# Patient Record
Sex: FEMALE | Race: WHITE | Employment: STUDENT | URBAN - NONMETROPOLITAN AREA
[De-identification: names, ages, dates, MRNs, and addresses within clinical notes are randomized per-mention and may not be internally consistent; named-entity substitution may affect disease eponyms.]

---
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
## 2020-02-13 LAB — EXTERNAL GROUP B STREP ANTIGEN: NEGATIVE

## 2020-02-16 ENCOUNTER — HOSPITAL ENCOUNTER (OUTPATIENT)
Facility: HOSPITAL | Age: 20
Discharge: HOME | End: 2020-02-16
Attending: OBSTETRICS & GYNECOLOGY | Admitting: OBSTETRICS & GYNECOLOGY

## 2020-02-16 VITALS
HEIGHT: 63 IN | SYSTOLIC BLOOD PRESSURE: 130 MMHG | HEART RATE: 88 BPM | DIASTOLIC BLOOD PRESSURE: 77 MMHG | RESPIRATION RATE: 18 BRPM | TEMPERATURE: 98 F | BODY MASS INDEX: 28.74 KG/M2 | WEIGHT: 162.2 LBS

## 2020-02-16 PROCEDURE — G0463 HOSPITAL OUTPT CLINIC VISIT: HCPCS

## 2020-02-17 NOTE — NURSING NOTE
Patient presented to LDR with c/o contractions. No leaking of blood or fluid reported. Reports feeling good fetal movement    Kira Leyva RN

## 2020-03-15 ENCOUNTER — ANESTHESIA (OUTPATIENT)
Dept: LABOR AND DELIVERY | Facility: HOSPITAL | Age: 20
End: 2020-03-15

## 2020-03-15 ENCOUNTER — HOSPITAL ENCOUNTER (INPATIENT)
Facility: HOSPITAL | Age: 20
LOS: 3 days | Discharge: HOME | End: 2020-03-18
Attending: OBSTETRICS & GYNECOLOGY | Admitting: OBSTETRICS & GYNECOLOGY

## 2020-03-15 ENCOUNTER — ANESTHESIA EVENT (OUTPATIENT)
Dept: LABOR AND DELIVERY | Facility: HOSPITAL | Age: 20
End: 2020-03-15

## 2020-03-15 DIAGNOSIS — Z3A.39 39 WEEKS GESTATION OF PREGNANCY: Primary | ICD-10-CM

## 2020-03-15 PROBLEM — Z37.9 NORMAL LABOR: Status: ACTIVE | Noted: 2020-03-15

## 2020-03-15 LAB
ABO GROUP BLD: NORMAL
BLD GP AB SCN SERPL QL: NEGATIVE
DEPRECATED RDW RBC AUTO: 39.5 FL (ref 37–54)
ERYTHROCYTE [DISTWIDTH] IN BLOOD BY AUTOMATED COUNT: 13.8 % (ref 12.3–15.4)
HCT VFR BLD AUTO: 34.4 % (ref 34–46.6)
HGB BLD-MCNC: 11.3 G/DL (ref 12–15.9)
MCH RBC QN AUTO: 26.4 PG (ref 26.6–33)
MCHC RBC AUTO-ENTMCNC: 32.8 G/DL (ref 31.5–35.7)
MCV RBC AUTO: 80.4 FL (ref 79–97)
PLATELET # BLD AUTO: 212 10*3/MM3 (ref 140–450)
PMV BLD AUTO: 12 FL (ref 6–12)
RBC # BLD AUTO: 4.28 10*6/MM3 (ref 3.77–5.28)
RH BLD: NEGATIVE
T&S EXPIRATION DATE: NORMAL
WBC NRBC COR # BLD: 13 10*3/MM3 (ref 3.4–10.8)

## 2020-03-15 PROCEDURE — 86850 RBC ANTIBODY SCREEN: CPT | Performed by: OBSTETRICS & GYNECOLOGY

## 2020-03-15 PROCEDURE — 25010000002 BUTORPHANOL PER 1 MG: Performed by: OBSTETRICS & GYNECOLOGY

## 2020-03-15 PROCEDURE — 85027 COMPLETE CBC AUTOMATED: CPT | Performed by: OBSTETRICS & GYNECOLOGY

## 2020-03-15 PROCEDURE — C1755 CATHETER, INTRASPINAL: HCPCS | Performed by: NURSE ANESTHETIST, CERTIFIED REGISTERED

## 2020-03-15 PROCEDURE — 25010000002 FENTANYL CITRATE (PF) 250 MCG/5ML SOLUTION: Performed by: NURSE ANESTHETIST, CERTIFIED REGISTERED

## 2020-03-15 PROCEDURE — 86901 BLOOD TYPING SEROLOGIC RH(D): CPT | Performed by: OBSTETRICS & GYNECOLOGY

## 2020-03-15 PROCEDURE — 86900 BLOOD TYPING SEROLOGIC ABO: CPT | Performed by: OBSTETRICS & GYNECOLOGY

## 2020-03-15 PROCEDURE — 25010000002 ROPIVACAINE PER 1 MG: Performed by: NURSE ANESTHETIST, CERTIFIED REGISTERED

## 2020-03-15 RX ORDER — FENTANYL CITRATE 50 UG/ML
INJECTION, SOLUTION INTRAMUSCULAR; INTRAVENOUS AS NEEDED
Status: DISCONTINUED | OUTPATIENT
Start: 2020-03-15 | End: 2020-03-16 | Stop reason: SURG

## 2020-03-15 RX ORDER — OXYTOCIN/0.9 % SODIUM CHLORIDE 30/500 ML
2-20 PLASTIC BAG, INJECTION (ML) INTRAVENOUS
Status: DISCONTINUED | OUTPATIENT
Start: 2020-03-16 | End: 2020-03-16

## 2020-03-15 RX ORDER — ROPIVACAINE HYDROCHLORIDE 2 MG/ML
INJECTION, SOLUTION EPIDURAL; INFILTRATION; PERINEURAL AS NEEDED
Status: DISCONTINUED | OUTPATIENT
Start: 2020-03-15 | End: 2020-03-16 | Stop reason: SURG

## 2020-03-15 RX ORDER — OXYTOCIN/0.9 % SODIUM CHLORIDE 30/500 ML
125 PLASTIC BAG, INJECTION (ML) INTRAVENOUS CONTINUOUS PRN
Status: COMPLETED | OUTPATIENT
Start: 2020-03-15 | End: 2020-03-16

## 2020-03-15 RX ORDER — TERBUTALINE SULFATE 1 MG/ML
0.25 INJECTION, SOLUTION SUBCUTANEOUS AS NEEDED
Status: DISCONTINUED | OUTPATIENT
Start: 2020-03-15 | End: 2020-03-16 | Stop reason: HOSPADM

## 2020-03-15 RX ORDER — SODIUM CHLORIDE 0.9 % (FLUSH) 0.9 %
1-10 SYRINGE (ML) INJECTION AS NEEDED
Status: DISCONTINUED | OUTPATIENT
Start: 2020-03-15 | End: 2020-03-16 | Stop reason: HOSPADM

## 2020-03-15 RX ORDER — OXYTOCIN/0.9 % SODIUM CHLORIDE 30/500 ML
250 PLASTIC BAG, INJECTION (ML) INTRAVENOUS CONTINUOUS
Status: DISPENSED | OUTPATIENT
Start: 2020-03-15 | End: 2020-03-15

## 2020-03-15 RX ORDER — OXYTOCIN/0.9 % SODIUM CHLORIDE 30/500 ML
999 PLASTIC BAG, INJECTION (ML) INTRAVENOUS ONCE
Status: COMPLETED | OUTPATIENT
Start: 2020-03-15 | End: 2020-03-16

## 2020-03-15 RX ORDER — BUTORPHANOL TARTRATE 1 MG/ML
1 INJECTION, SOLUTION INTRAMUSCULAR; INTRAVENOUS
Status: DISCONTINUED | OUTPATIENT
Start: 2020-03-15 | End: 2020-03-15

## 2020-03-15 RX ORDER — SODIUM CHLORIDE 0.9 % (FLUSH) 0.9 %
3 SYRINGE (ML) INJECTION EVERY 12 HOURS SCHEDULED
Status: DISCONTINUED | OUTPATIENT
Start: 2020-03-15 | End: 2020-03-16 | Stop reason: HOSPADM

## 2020-03-15 RX ORDER — LIDOCAINE HYDROCHLORIDE AND EPINEPHRINE 15; 5 MG/ML; UG/ML
INJECTION, SOLUTION EPIDURAL AS NEEDED
Status: DISCONTINUED | OUTPATIENT
Start: 2020-03-15 | End: 2020-03-16 | Stop reason: SURG

## 2020-03-15 RX ORDER — ACETAMINOPHEN 325 MG/1
650 TABLET ORAL EVERY 4 HOURS PRN
Status: DISCONTINUED | OUTPATIENT
Start: 2020-03-15 | End: 2020-03-16 | Stop reason: HOSPADM

## 2020-03-15 RX ORDER — BUTORPHANOL TARTRATE 1 MG/ML
2 INJECTION, SOLUTION INTRAMUSCULAR; INTRAVENOUS
Status: DISCONTINUED | OUTPATIENT
Start: 2020-03-15 | End: 2020-03-16 | Stop reason: HOSPADM

## 2020-03-15 RX ORDER — SODIUM CHLORIDE, SODIUM LACTATE, POTASSIUM CHLORIDE, CALCIUM CHLORIDE 600; 310; 30; 20 MG/100ML; MG/100ML; MG/100ML; MG/100ML
125 INJECTION, SOLUTION INTRAVENOUS CONTINUOUS
Status: DISCONTINUED | OUTPATIENT
Start: 2020-03-15 | End: 2020-03-16

## 2020-03-15 RX ORDER — BUTORPHANOL TARTRATE 1 MG/ML
1 INJECTION, SOLUTION INTRAMUSCULAR; INTRAVENOUS
Status: DISCONTINUED | OUTPATIENT
Start: 2020-03-15 | End: 2020-03-16 | Stop reason: SDUPTHER

## 2020-03-15 RX ORDER — EPHEDRINE SULFATE 50 MG/ML
10 INJECTION, SOLUTION INTRAVENOUS
Status: DISCONTINUED | OUTPATIENT
Start: 2020-03-15 | End: 2020-03-16 | Stop reason: HOSPADM

## 2020-03-15 RX ORDER — LIDOCAINE HYDROCHLORIDE 10 MG/ML
5 INJECTION, SOLUTION EPIDURAL; INFILTRATION; INTRACAUDAL; PERINEURAL AS NEEDED
Status: DISCONTINUED | OUTPATIENT
Start: 2020-03-15 | End: 2020-03-16 | Stop reason: HOSPADM

## 2020-03-15 RX ADMIN — SODIUM CHLORIDE, POTASSIUM CHLORIDE, SODIUM LACTATE AND CALCIUM CHLORIDE 125 ML/HR: 600; 310; 30; 20 INJECTION, SOLUTION INTRAVENOUS at 21:00

## 2020-03-15 RX ADMIN — ROPIVACAINE HYDROCHLORIDE 10 ML: 2 INJECTION, SOLUTION EPIDURAL; INFILTRATION at 19:50

## 2020-03-15 RX ADMIN — BUTORPHANOL TARTRATE 1 MG: 1 INJECTION, SOLUTION INTRAMUSCULAR; INTRAVENOUS at 18:54

## 2020-03-15 RX ADMIN — ROPIVACAINE HYDROCHLORIDE 8 ML/HR: 2 INJECTION, SOLUTION EPIDURAL; INFILTRATION at 19:59

## 2020-03-15 RX ADMIN — SODIUM CHLORIDE, POTASSIUM CHLORIDE, SODIUM LACTATE AND CALCIUM CHLORIDE 125 ML/HR: 600; 310; 30; 20 INJECTION, SOLUTION INTRAVENOUS at 19:16

## 2020-03-15 RX ADMIN — SODIUM CHLORIDE, POTASSIUM CHLORIDE, SODIUM LACTATE AND CALCIUM CHLORIDE 999 ML/HR: 600; 310; 30; 20 INJECTION, SOLUTION INTRAVENOUS at 19:30

## 2020-03-15 RX ADMIN — LIDOCAINE HYDROCHLORIDE AND EPINEPHRINE 3 ML: 15; 5 INJECTION, SOLUTION EPIDURAL at 19:41

## 2020-03-15 RX ADMIN — FENTANYL CITRATE 150 MCG: 50 INJECTION, SOLUTION INTRAMUSCULAR; INTRAVENOUS at 19:59

## 2020-03-15 RX ADMIN — SODIUM CHLORIDE, POTASSIUM CHLORIDE, SODIUM LACTATE AND CALCIUM CHLORIDE 125 ML/HR: 600; 310; 30; 20 INJECTION, SOLUTION INTRAVENOUS at 17:04

## 2020-03-15 RX ADMIN — FENTANYL CITRATE 100 MCG: 50 INJECTION, SOLUTION INTRAMUSCULAR; INTRAVENOUS at 19:50

## 2020-03-15 RX ADMIN — OXYTOCIN-SODIUM CHLORIDE 0.9% IV SOLN 30 UNIT/500ML 2 MILLI-UNITS/MIN: 30-0.9/5 SOLUTION at 23:18

## 2020-03-15 RX ADMIN — BUTORPHANOL TARTRATE 1 MG: 1 INJECTION, SOLUTION INTRAMUSCULAR; INTRAVENOUS at 17:36

## 2020-03-15 NOTE — PROGRESS NOTES
Prasanna Alexander MD  [facility]  [address]  [facility], [place]  Office [phone]  Fax [phone]      [facility]  Melissa Ruano  : 2000  MRN: [id number]  CSN: [id number]    Labor progress note    Subjective   She reports is feeling painful contraction     Objective   Min/max vitals past 24 hours:  Temp  Min: 97.8 °F (36.6 °C)  Max: 97.8 °F (36.6 °C)   BP  Min: 131/86  Max: 131/86   Pulse  Min: 76  Max: 76   Resp  Min: 18  Max: 18        FHT's: reactive and category 1.  external monitors used   Cervix: was checked (by me): 3 cm / 90 % / -2   Contractions: irregular      Assessment   1. IUP at 39w4d  2. Spontaneous onset of labor  3. GBS negative  4. Fetal status reassuring     Plan   1.   Expectant management  AROM - Clear fluid seen  OK for epidural  Allow labor to continue pending maternal and fetal status  Plan discussed with family and questions answered.  Understanding verbalized.    Prasanna Alexander MD  3/15/2020  18:47

## 2020-03-15 NOTE — H&P
"    Prasanna Aelxander MD  [facility]  [address]  [place] [address]  Office [phone]  Fax [phone]      [facility]  Melissa Ruano  : 2000  MRN: [id number]  CSN: [id number]    History and Physical    Subjective   Melissa Ruano is a 19 y.o. year old  with an Estimated Date of Delivery: 3/18/20 currently at 39w4d presenting with regular contractions.  Patient has made cervical change based on my exam.  Over a 2-hour period, she is changed from 1 cm to 3 cm.  Her effacement has also increased.    Prenatal care has been with Dr. Rangel.  It has been benign.    OB History    Para Term  AB Living   1 0 0 0 0 0   SAB TAB Ectopic Molar Multiple Live Births   0 0 0 0 0 0      # Outcome Date GA Lbr Sarwat/2nd Weight Sex Delivery Anes PTL Lv   1 Current                Past Medical History:   Diagnosis Date   • Ovarian cyst        Past Surgical History:   Procedure Laterality Date   • DENTAL PROCEDURE      DENTAL SURGERY- METAL CAPS       No current facility-administered medications for this encounter.     Allergies   Allergen Reactions   • Penicillins Hives     Pt stated since she was a baby   • Latex Swelling       History reviewed. No pertinent family history.    Social History     Tobacco Use   • Smoking status: Former Smoker   • Smokeless tobacco: Never Used   Substance Use Topics   • Alcohol use: Never     Frequency: Never   • Drug use: Never       Review of Systems        Objective   /86 (BP Location: Right arm, Patient Position: Lying)   Pulse 76   Temp 97.8 °F (36.6 °C) (Temporal)   Resp 18   Ht 160 cm (63\")   Wt 75 kg (165 lb 6 oz)   BMI 29.29 kg/m²   General: well developed; well nourished  no acute distress   Heart: Not performed.   Lungs: breathing is unlabored   Abdomen: soft, non-tender; no masses  no umbilical or inguinal hernias are present  no hepato-splenomegaly   FHT's: reactive and category 1  external monitors used   Cervix: was " checked (by me): 3 cm / 90 % / -3   Presentation: cephalic   Contractions: regular    EFW 7 to 8 pounds by Leopold's  Pelvis subjectively adequate     Prenatal Labs  Lab Results   Component Value Date    HEPBSAG Negative 08/08/2019    ABO O 08/20/2019    RH Negative 08/20/2019    ABSCRN Negative 08/20/2019    [id number] Non-Reactive 08/08/2019    URINECX 25,000 CFU/mL Lactobacillus species (A) 12/22/2019       Current Labs Reviewed   Prenatal labs       Assessment   1. IUP at 39w4d  2. Group B strep status: negative  3. Fetal status reassuring  4. Spontaneous onset of labor, early     Plan   1. Expectant management   2. Admission  3. AROM when appropriate  4. Epidural when indicated    Prasanna Alexander MD  3/15/2020  16:25

## 2020-03-16 PROCEDURE — 88307 TISSUE EXAM BY PATHOLOGIST: CPT | Performed by: OBSTETRICS & GYNECOLOGY

## 2020-03-16 PROCEDURE — 10907ZC DRAINAGE OF AMNIOTIC FLUID, THERAPEUTIC FROM PRODUCTS OF CONCEPTION, VIA NATURAL OR ARTIFICIAL OPENING: ICD-10-PCS | Performed by: OBSTETRICS & GYNECOLOGY

## 2020-03-16 PROCEDURE — 59409 OBSTETRICAL CARE: CPT | Performed by: OBSTETRICS & GYNECOLOGY

## 2020-03-16 PROCEDURE — 0HQ9XZZ REPAIR PERINEUM SKIN, EXTERNAL APPROACH: ICD-10-PCS | Performed by: OBSTETRICS & GYNECOLOGY

## 2020-03-16 RX ORDER — ONDANSETRON 4 MG/1
4 TABLET, FILM COATED ORAL EVERY 6 HOURS PRN
Status: DISCONTINUED | OUTPATIENT
Start: 2020-03-16 | End: 2020-03-18 | Stop reason: HOSPADM

## 2020-03-16 RX ORDER — CALCIUM CARBONATE 200(500)MG
2 TABLET,CHEWABLE ORAL 3 TIMES DAILY PRN
Status: DISCONTINUED | OUTPATIENT
Start: 2020-03-16 | End: 2020-03-18 | Stop reason: HOSPADM

## 2020-03-16 RX ORDER — BUTORPHANOL TARTRATE 1 MG/ML
1 INJECTION, SOLUTION INTRAMUSCULAR; INTRAVENOUS
Status: DISCONTINUED | OUTPATIENT
Start: 2020-03-16 | End: 2020-03-16 | Stop reason: HOSPADM

## 2020-03-16 RX ORDER — MISOPROSTOL 200 UG/1
800 TABLET ORAL AS NEEDED
Status: DISCONTINUED | OUTPATIENT
Start: 2020-03-16 | End: 2020-03-16 | Stop reason: HOSPADM

## 2020-03-16 RX ORDER — IBUPROFEN 600 MG/1
600 TABLET ORAL EVERY 8 HOURS PRN
Status: DISCONTINUED | OUTPATIENT
Start: 2020-03-16 | End: 2020-03-18 | Stop reason: HOSPADM

## 2020-03-16 RX ORDER — ONDANSETRON 4 MG/1
4 TABLET, FILM COATED ORAL EVERY 6 HOURS PRN
Status: DISCONTINUED | OUTPATIENT
Start: 2020-03-16 | End: 2020-03-16 | Stop reason: HOSPADM

## 2020-03-16 RX ORDER — CARBOPROST TROMETHAMINE 250 UG/ML
250 INJECTION, SOLUTION INTRAMUSCULAR AS NEEDED
Status: DISCONTINUED | OUTPATIENT
Start: 2020-03-16 | End: 2020-03-16 | Stop reason: HOSPADM

## 2020-03-16 RX ORDER — HYDROCODONE BITARTRATE AND ACETAMINOPHEN 7.5; 325 MG/1; MG/1
1 TABLET ORAL EVERY 4 HOURS PRN
Status: DISCONTINUED | OUTPATIENT
Start: 2020-03-16 | End: 2020-03-18 | Stop reason: HOSPADM

## 2020-03-16 RX ORDER — PRENATAL VIT/IRON FUM/FOLIC AC 27MG-0.8MG
1 TABLET ORAL DAILY
Status: DISCONTINUED | OUTPATIENT
Start: 2020-03-16 | End: 2020-03-18 | Stop reason: HOSPADM

## 2020-03-16 RX ORDER — DOCUSATE SODIUM 100 MG/1
100 CAPSULE, LIQUID FILLED ORAL 2 TIMES DAILY PRN
Status: DISCONTINUED | OUTPATIENT
Start: 2020-03-16 | End: 2020-03-18 | Stop reason: HOSPADM

## 2020-03-16 RX ORDER — PROMETHAZINE HYDROCHLORIDE 25 MG/ML
12.5 INJECTION, SOLUTION INTRAMUSCULAR; INTRAVENOUS EVERY 6 HOURS PRN
Status: DISCONTINUED | OUTPATIENT
Start: 2020-03-16 | End: 2020-03-18 | Stop reason: HOSPADM

## 2020-03-16 RX ORDER — PROMETHAZINE HYDROCHLORIDE 12.5 MG/1
12.5 SUPPOSITORY RECTAL EVERY 6 HOURS PRN
Status: DISCONTINUED | OUTPATIENT
Start: 2020-03-16 | End: 2020-03-18 | Stop reason: HOSPADM

## 2020-03-16 RX ORDER — LIDOCAINE HYDROCHLORIDE 20 MG/ML
20 INJECTION, SOLUTION INFILTRATION; PERINEURAL ONCE
Status: DISCONTINUED | OUTPATIENT
Start: 2020-03-16 | End: 2020-03-16

## 2020-03-16 RX ORDER — METHYLERGONOVINE MALEATE 0.2 MG/ML
200 INJECTION INTRAVENOUS ONCE AS NEEDED
Status: DISCONTINUED | OUTPATIENT
Start: 2020-03-16 | End: 2020-03-16 | Stop reason: HOSPADM

## 2020-03-16 RX ORDER — SODIUM CHLORIDE 0.9 % (FLUSH) 0.9 %
1-10 SYRINGE (ML) INJECTION AS NEEDED
Status: DISCONTINUED | OUTPATIENT
Start: 2020-03-16 | End: 2020-03-18 | Stop reason: HOSPADM

## 2020-03-16 RX ORDER — ONDANSETRON 2 MG/ML
4 INJECTION INTRAMUSCULAR; INTRAVENOUS EVERY 6 HOURS PRN
Status: DISCONTINUED | OUTPATIENT
Start: 2020-03-16 | End: 2020-03-18 | Stop reason: HOSPADM

## 2020-03-16 RX ORDER — LIDOCAINE HYDROCHLORIDE 20 MG/ML
INJECTION, SOLUTION INFILTRATION; PERINEURAL
Status: DISPENSED
Start: 2020-03-16 | End: 2020-03-16

## 2020-03-16 RX ORDER — IBUPROFEN 800 MG/1
800 TABLET ORAL EVERY 8 HOURS PRN
Status: DISCONTINUED | OUTPATIENT
Start: 2020-03-16 | End: 2020-03-16 | Stop reason: HOSPADM

## 2020-03-16 RX ORDER — PROMETHAZINE HYDROCHLORIDE 25 MG/1
25 TABLET ORAL EVERY 6 HOURS PRN
Status: DISCONTINUED | OUTPATIENT
Start: 2020-03-16 | End: 2020-03-18 | Stop reason: HOSPADM

## 2020-03-16 RX ORDER — BISACODYL 10 MG
10 SUPPOSITORY, RECTAL RECTAL DAILY PRN
Status: DISCONTINUED | OUTPATIENT
Start: 2020-03-17 | End: 2020-03-18 | Stop reason: HOSPADM

## 2020-03-16 RX ORDER — ONDANSETRON 2 MG/ML
4 INJECTION INTRAMUSCULAR; INTRAVENOUS EVERY 6 HOURS PRN
Status: DISCONTINUED | OUTPATIENT
Start: 2020-03-16 | End: 2020-03-16 | Stop reason: HOSPADM

## 2020-03-16 RX ADMIN — HYDROCODONE BITARTRATE AND ACETAMINOPHEN 1 TABLET: 7.5; 325 TABLET ORAL at 11:32

## 2020-03-16 RX ADMIN — OXYTOCIN-SODIUM CHLORIDE 0.9% IV SOLN 30 UNIT/500ML 999 ML/HR: 30-0.9/5 SOLUTION at 01:40

## 2020-03-16 RX ADMIN — IBUPROFEN 600 MG: 600 TABLET ORAL at 20:50

## 2020-03-16 RX ADMIN — HYDROCODONE BITARTRATE AND ACETAMINOPHEN 1 TABLET: 7.5; 325 TABLET ORAL at 16:14

## 2020-03-16 RX ADMIN — BENZOCAINE AND LEVOMENTHOL 1 APPLICATION: 200; 5 SPRAY TOPICAL at 05:28

## 2020-03-16 RX ADMIN — PRENATAL VIT W/ FE FUMARATE-FA TAB 27-0.8 MG 1 TABLET: 27-0.8 TAB at 08:48

## 2020-03-16 RX ADMIN — HYDROCODONE BITARTRATE AND ACETAMINOPHEN 1 TABLET: 7.5; 325 TABLET ORAL at 20:50

## 2020-03-16 RX ADMIN — IBUPROFEN 800 MG: 800 TABLET, FILM COATED ORAL at 03:29

## 2020-03-16 RX ADMIN — OXYTOCIN-SODIUM CHLORIDE 0.9% IV SOLN 30 UNIT/500ML 250 ML/HR: 30-0.9/5 SOLUTION at 02:02

## 2020-03-16 NOTE — PLAN OF CARE
Problem: Patient Care Overview  Goal: Plan of Care Review  Flowsheets (Taken 3/16/2020 1716)  Progress: improving  Plan of Care Reviewed With: patient  Outcome Summary: VSS.  Patient is firm, midline and U1. Lochia small amount.  Patient is breastfeeding.  Paitent using comfort measures for first degree laceration.  Patient has small swelling to labia.

## 2020-03-16 NOTE — LACTATION NOTE
"Mother's Name: Melissa Ruano  Phone #: [phone]  Infant Name: Freddie  : [birth year]  Gestation: 39w5d  Day of life: 0  Birth weight:   6-14.8 (3140g)  Discharge weight:  Weight Loss:   24 hour Summary of Feeds: 3 Voids: DTV Stools:1  Assistive devices (shields, shells, etc):  Significant Maternal history: , Ovarian cysts, former smoker  Maternal Concerns:    Maternal Goal: Exclusively Breastfeed  Mother's Medications: PNV  Breastpump for home: Rx faxed to [facility]  Ped follow up appt:    Assisted with waking, positioning, latching, and keeping infant awake at the breast. Patient return demonstrated latching infant with wide open mouth. Infant difficult to keep awake. Infant latches well and sucks well, but quickly falls asleep. \"Baby situps\" used to stimulate Blackstone reflex for waking, but infant does not root to finger, only breast. Allowed infant to rest and patient easily hand expressed large drops and finger fed infant. Reviewed initial breastfeeding packet and book provided. FOB present and supportive. Patient plans to allow FOB to attempt to wake infant using \"baby situp\" and try breastfeeding again while expressing drops. Offered assistance. She prefers to attempt and call if needed. Belt phone number on communication board.     Instructed mom our lactation team is here for continued support throughout their breastfeeding journey. Our team has encouraged mom to call with any questions or concerns that may arise after discharge.     1250  Infant remains sleepy. Hand pump provided per pt request. She states her electric breast pump is ready for  and she prefers to try that one rather than hospital pump. Discussed hand expressing and pumping for EBM to feed infant, as patient feels infant has not had \"enough.\" She also states she is \"not ready to give up.\" Offered support, assistance, and encouragement. She wishes to attempt to collect milk independently and states she will call for assistance as needed. "     1530  Assisted with positioning infant at breast in ventral position after FOB woke infant for feeding. Patient latched infant well and began compressing her breast.Infant responded with deep jaw drops. Infant nursed well for 15 minutes on the right. Assisted with positioning on the left as well and infant again latched well and patient smiling. Praised parents for good feeding and offered continued encouragement. Reviewed feeding plan, expected infant voids/stools, and positioning for deep latching. Recommended continuing to hand express and offer drops in addition to feeds.

## 2020-03-16 NOTE — ANESTHESIA PREPROCEDURE EVALUATION
Anesthesia Evaluation     NPO Solid Status: > 8 hours  NPO Liquid Status: > 4 hours           Airway   Mallampati: II  TM distance: >3 FB  Neck ROM: full  Dental - normal exam     Pulmonary - negative pulmonary ROS   Cardiovascular - negative cardio ROS        Neuro/Psych- negative ROS  GI/Hepatic/Renal/Endo - negative ROS     Musculoskeletal (-) negative ROS    Abdominal    Substance History - negative use     OB/GYN    (+) Pregnant,         Other - negative ROS                       Anesthesia Plan    ASA 2     epidural   (Lab Results       Component                Value               Date                       WBC                      13.00 (H)           03/15/2020                 HGB                      11.3 (L)            03/15/2020                 HCT                      34.4                03/15/2020                 MCV                      80.4                03/15/2020                 PLT                      212                 03/15/2020            r and b of epidural explained to pt including bleeding, infection, nerve damage, back pain, pdph, and poss GA, pt verbalizes understanding and wishes to proceed.)

## 2020-03-16 NOTE — L&D DELIVERY NOTE
[facility]  Vaginal Delivery Note    Delivery     Delivery: Vaginal, Spontaneous     YOB: 2020    Time of Birth:  Gestational Age 1:38 AM   39w5d     Anesthesia: Epidural     Delivering clinician: Prasanna Alexander    Forceps?   No   Vacuum? No    Shoulder dystocia present: No        Delivery narrative:  Live, viable infant male delivered without difficulty or complications with clear fluid.  Moving all extremities and with good cry and tone.  Placenta delivered intact with a 3 vessel cord noted.  Bilateral labial and 1st degree ML laceration(s).   mL.  Sponge and needle counts were correct.    Infant    Findings: male  infant     Infant observations: Weight: No birth weight on file.   Length:    in  Observations/Comments:         Apgars:    @ 1 minute /       @ 5 minutes   Infant Name: Freddie     Placenta, Cord, and Fluid    Placenta delivered     at         Cord:    present.   Nuchal Cord?  no   Cord blood obtained:      Cord gases obtained:       Cord gas results: Venous:  No results found for: PHCVEN    Arterial:  No results found for: PHCART     Repair    Episiotomy: Not recorded     No    Lacerations: Yes  Laceration Information  Laceration Repaired?   Perineal:         Periurethral:         Labial:         Sulcus:         Vaginal:         Cervical:           Suture used for repair: 2-0 and 3-0 chromic gut   Estimated Blood Loss:             Complications  none    Disposition  Mother to Mother Baby/Postpartum  in stable condition currently.  Baby to remains with mom  in stable condition currently.      Prasanna Alexander MD  03/16/20  02:07

## 2020-03-16 NOTE — ANESTHESIA PROCEDURE NOTES
Labor Epidural      Patient location during procedure: OB  Performed By  CRNA: Ulisses Cardenas CRNA  Preanesthetic Checklist  Completed: patient identified, site marked, surgical consent, pre-op evaluation, timeout performed, IV checked, risks and benefits discussed and monitors and equipment checked  Additional Notes  alicia procedure well  Prep:  Pt Position:sitting  Sterile Tech:cap, gloves, mask and sterile barrier  Prep:chlorhexidine gluconate and isopropyl alcohol  Monitoring:blood pressure monitoring and continuous pulse oximetry  Epidural Block Procedure:  Approach:midline  Guidance:landmark technique  Location:L4-L5  Needle Type:Tuohy  Needle Gauge:18 G  Loss of Resistance Medium: saline  Loss of Resistance: 6cm  Cath Depth at skin:12 cm  Paresthesia: none  Aspiration:negative  Test Dose:negative  Number of Attempts: 1  Post Assessment:  Dressing:occlusive dressing applied and secured with tape  Pt Tolerance:patient tolerated the procedure well with no apparent complications  Complications:no

## 2020-03-16 NOTE — ANESTHESIA POSTPROCEDURE EVALUATION
Patient: Melissa Ruano    Procedure Summary     Date:  03/15/20 Room / Location:      Anesthesia Start:  1929 Anesthesia Stop:  03/16/20 0138    Procedure:  LABOR ANALGESIA Diagnosis:      Scheduled Providers:   Provider:  Ulisses Cardenas CRNA    Anesthesia Type:  epidural ASA Status:  2          Anesthesia Type: No value filed.    Vitals  Vitals Value Taken Time   /61 3/16/2020  7:30 AM   Temp 98 °F (36.7 °C) 3/16/2020  7:30 AM   Pulse 77 3/16/2020  7:30 AM   Resp 16 3/16/2020  7:30 AM   SpO2 98 % 3/16/2020  7:30 AM           Post Anesthesia Care and Evaluation    Patient location during evaluation: floor  Patient participation: complete - patient participated  Level of consciousness: awake and alert  Pain management: adequate  Airway patency: patent  Anesthetic complications: No anesthetic complications  PONV Status: none  Cardiovascular status: acceptable  Respiratory status: acceptable  Hydration status: acceptable  Post Neuraxial Block status: Motor and sensory function returned to baseline and No signs or symptoms of PDPHNo anesthesia care post op

## 2020-03-17 LAB
ABO GROUP BLD: NORMAL
BASOPHILS # BLD AUTO: 0.03 10*3/MM3 (ref 0–0.2)
BASOPHILS NFR BLD AUTO: 0.2 % (ref 0–1.5)
BLD GP AB SCN SERPL QL: NEGATIVE
DEPRECATED RDW RBC AUTO: 40.3 FL (ref 37–54)
EOSINOPHIL # BLD AUTO: 0.18 10*3/MM3 (ref 0–0.4)
EOSINOPHIL NFR BLD AUTO: 1.5 % (ref 0.3–6.2)
ERYTHROCYTE [DISTWIDTH] IN BLOOD BY AUTOMATED COUNT: 13.9 % (ref 12.3–15.4)
FETAL BLEED: NEGATIVE
HCT VFR BLD AUTO: 28.6 % (ref 34–46.6)
HGB BLD-MCNC: 9.2 G/DL (ref 12–15.9)
HOLD SPECIMEN: NORMAL
IMM GRANULOCYTES # BLD AUTO: 0.1 10*3/MM3 (ref 0–0.05)
IMM GRANULOCYTES NFR BLD AUTO: 0.8 % (ref 0–0.5)
LYMPHOCYTES # BLD AUTO: 2.32 10*3/MM3 (ref 0.7–3.1)
LYMPHOCYTES NFR BLD AUTO: 19.1 % (ref 19.6–45.3)
MCH RBC QN AUTO: 26.1 PG (ref 26.6–33)
MCHC RBC AUTO-ENTMCNC: 32.2 G/DL (ref 31.5–35.7)
MCV RBC AUTO: 81.3 FL (ref 79–97)
MONOCYTES # BLD AUTO: 1.03 10*3/MM3 (ref 0.1–0.9)
MONOCYTES NFR BLD AUTO: 8.5 % (ref 5–12)
NEUTROPHILS # BLD AUTO: 8.47 10*3/MM3 (ref 1.7–7)
NEUTROPHILS NFR BLD AUTO: 69.9 % (ref 42.7–76)
NRBC BLD AUTO-RTO: 0 /100 WBC (ref 0–0.2)
NUMBER OF DOSES: NORMAL
PLATELET # BLD AUTO: 183 10*3/MM3 (ref 140–450)
PMV BLD AUTO: 11.5 FL (ref 6–12)
RBC # BLD AUTO: 3.52 10*6/MM3 (ref 3.77–5.28)
RH BLD: NEGATIVE
WBC NRBC COR # BLD: 12.13 10*3/MM3 (ref 3.4–10.8)

## 2020-03-17 PROCEDURE — 86900 BLOOD TYPING SEROLOGIC ABO: CPT | Performed by: OBSTETRICS & GYNECOLOGY

## 2020-03-17 PROCEDURE — 86850 RBC ANTIBODY SCREEN: CPT

## 2020-03-17 PROCEDURE — 85461 HEMOGLOBIN FETAL: CPT | Performed by: OBSTETRICS & GYNECOLOGY

## 2020-03-17 PROCEDURE — 25010000003 RHO D IMMUNE GLOBULIN 1500 UNITS SOLUTION PREFILLED SYRINGE: Performed by: OBSTETRICS & GYNECOLOGY

## 2020-03-17 PROCEDURE — 85025 COMPLETE CBC W/AUTO DIFF WBC: CPT | Performed by: OBSTETRICS & GYNECOLOGY

## 2020-03-17 PROCEDURE — 99231 SBSQ HOSP IP/OBS SF/LOW 25: CPT | Performed by: OBSTETRICS & GYNECOLOGY

## 2020-03-17 PROCEDURE — 86901 BLOOD TYPING SEROLOGIC RH(D): CPT | Performed by: OBSTETRICS & GYNECOLOGY

## 2020-03-17 RX ADMIN — HYDROCODONE BITARTRATE AND ACETAMINOPHEN 1 TABLET: 7.5; 325 TABLET ORAL at 23:15

## 2020-03-17 RX ADMIN — PRENATAL VIT W/ FE FUMARATE-FA TAB 27-0.8 MG 1 TABLET: 27-0.8 TAB at 08:15

## 2020-03-17 RX ADMIN — HYDROCODONE BITARTRATE AND ACETAMINOPHEN 1 TABLET: 7.5; 325 TABLET ORAL at 13:51

## 2020-03-17 RX ADMIN — IBUPROFEN 600 MG: 600 TABLET ORAL at 19:00

## 2020-03-17 RX ADMIN — HYDROCODONE BITARTRATE AND ACETAMINOPHEN 1 TABLET: 7.5; 325 TABLET ORAL at 04:03

## 2020-03-17 RX ADMIN — HUMAN RHO(D) IMMUNE GLOBULIN 1500 UNITS: 300 INJECTION, SOLUTION INTRAMUSCULAR at 15:55

## 2020-03-17 RX ADMIN — IBUPROFEN 600 MG: 600 TABLET ORAL at 08:15

## 2020-03-17 NOTE — PLAN OF CARE
Problem: Patient Care Overview  Goal: Plan of Care Review  Outcome: Ongoing (interventions implemented as appropriate)  Flowsheets (Taken 3/17/2020 1422)  Progress: improving  Plan of Care Reviewed With: patient; significant other  Outcome Summary: VSS, fundus firm, midline, U1, lochia scant, continue to receive po pain medicatios, tolerating activities fair,  labia with some swelling noted pt continue to use ice pack and dermoplast spray prn, Rhogam will be given, EPDS 4, saline lock removed

## 2020-03-17 NOTE — PROGRESS NOTES
"[facility]  Vaginal Delivery Progress Note    Subjective   Postpartum Day 1: Vaginal Delivery    The patient feels well.  Her pain is well controlled with ibuprofen (OTC) and Norco.   She is ambulating well.  Patient describes her bleeding as thick, heavy lochia.    Breastfeeding: infant latching without difficulty.    Objective     Vital Signs Range for the last 24 hours  Temperature: Temp:  [97.8 °F (36.6 °C)-98.3 °F (36.8 °C)] 97.9 °F (36.6 °C)   Temp Source: Temp src: Oral   BP: BP: (108-140)/(60-75) 122/75   Pulse: Heart Rate:  [] 97   Respirations: Resp:  [18-20] 18   SPO2: SpO2:  [98 %-99 %] 98 %   O2 Amount (l/min):     O2 Devices Device (Oxygen Therapy): room air   Weight:       Admit Height:  Height: 160 cm (63\")      Physical Exam:  General:  no acute distresss.  Lungs:  breathing is unlabored  Abdomen: Fundus: appropriate, firm, non tender  Extremities: normal, atraumatic, no cyanosis, and Negative edema.     Lab results reviewed:  Yes   Rubella:  No results found for: RUBELLAIGGIN Nurse Transcribed from prenatal record --  No components found for: EXTRUBELQUAL  Rh Status:    RH type   Date Value Ref Range Status   03/17/2020 Negative  Final     Immunizations:   There is no immunization history on file for this patient.  Lab Results (last 24 hours)     Procedure Component Value Units Date/Time    Extra Tubes [[id number]] Collected:  03/17/20 0631    Specimen:  Blood, Venous Line Updated:  03/17/20 0745    Narrative:       The following orders were created for panel order Extra Tubes.  Procedure                               Abnormality         Status                     ---------                               -----------         ------                     Green Top (Gel)[[id number]]                                  Final result                 Please view results for these tests on the individual orders.    Green Top (Gel) [[id number]] Collected:  03/17/20 0631    Specimen:  Blood Updated:  03/17/20 " 0745     Extra Tube Hold for add-ons.     Comment: Auto resulted.       CBC & Differential [[id number]] Collected:  03/17/20 0630    Specimen:  Blood Updated:  03/17/20 0700    Narrative:       The following orders were created for panel order CBC & Differential.  Procedure                               Abnormality         Status                     ---------                               -----------         ------                     CBC Auto Differential[[id number]]        Abnormal            Final result                 Please view results for these tests on the individual orders.    CBC Auto Differential [[id number]]  (Abnormal) Collected:  03/17/20 0630    Specimen:  Blood Updated:  03/17/20 0700     WBC 12.13 10*3/mm3      RBC 3.52 10*6/mm3      Hemoglobin 9.2 g/dL      Hematocrit 28.6 %      MCV 81.3 fL      MCH 26.1 pg      MCHC 32.2 g/dL      RDW 13.9 %      RDW-SD 40.3 fl      MPV 11.5 fL      Platelets 183 10*3/mm3      Neutrophil % 69.9 %      Lymphocyte % 19.1 %      Monocyte % 8.5 %      Eosinophil % 1.5 %      Basophil % 0.2 %      Immature Grans % 0.8 %      Neutrophils, Absolute 8.47 10*3/mm3      Lymphocytes, Absolute 2.32 10*3/mm3      Monocytes, Absolute 1.03 10*3/mm3      Eosinophils, Absolute 0.18 10*3/mm3      Basophils, Absolute 0.03 10*3/mm3      Immature Grans, Absolute 0.10 10*3/mm3      nRBC 0.0 /100 WBC           Assessment/Plan       Normal labor    Pregnancy      Melissa Huitron Ruano is Day 1  post-partum  Vaginal, Spontaneous    .      Plan:  Continue current care.  D/C tomorrow      Tania Hills MD  3/17/2020  08:34

## 2020-03-17 NOTE — LACTATION NOTE
Mother's Name: Melissa Ruano  Phone #: [phone]  Infant Name: Freddie  : [birth year]  Gestation: 39w5d  Day of life: 1  Birth weight:   6-14.8 (3140g)  Discharge weight:  Weight Loss: -3.38%  24 hour Summary of Feeds: 6BF + gtts EBM Voids: 1 Stools: 2  Assistive devices (shields, shells, etc):  Significant Maternal history: , Ovarian cysts, former smoker  Maternal Concerns:  Latching, sore nipples  Maternal Goal: Exclusively Breastfeed  Mother's Medications: PNV  Breastpump for home: Rx faxed to [facility]  Ped follow up appt:    Patient requested latch assessment due to sore nipple. Bilateral nipples tender and red. Infant latched in ventral/side-lying position. Infant latched deeply with lips flared and deep jaw dropping sucks noted. Audible swallows noted. Infant alert at the breast. Patient and FOB state they feel feedings are going much better. Encouraged continued hand expression as often as possible, before and after feedings to increase intake and avoid excessive weight loss. Reviewed feeding plan, expected voids/stools, expected infant weight loss, signs of a good feeding, signs of milk transitioning, and preparing for discharge home.     Instructed mom our lactation team is here for continued support throughout their breastfeeding journey. Our team has encouraged mom to call with any questions or concerns that may arise after discharge.

## 2020-03-17 NOTE — PLAN OF CARE
Problem: Patient Care Overview  Goal: Plan of Care Review  Outcome: Ongoing (interventions implemented as appropriate)  Flowsheets  Taken 3/17/2020 0619  Progress: improving  Outcome Summary: VSS, FFML lochia scant. Pt using ice packs,dermaplast along with a squirt bottle for kyle care r/t first degree laceration.  Percocet x 2 and Motrin x 1 given for pain with good results.  Pt bonding with baby and breastfeeding is going well.  Pt is ambulating in room and has been able to sleep during this shift.  Taken 3/16/2020 2028  Plan of Care Reviewed With: patient;significant other  Goal: Individualization and Mutuality  Outcome: Ongoing (interventions implemented as appropriate)  Flowsheets (Taken 3/17/2020 0619)  Patient Specific Goals (Include Timeframe): Home with healthy baby via breastfeeding by discharge  How to Address Anxieties/Fears: none voiced this shift  Patient Specific Interventions: comfort products for kyle care and pain medication  What Information Would Help Us Give You More Personalized Care?: none voiced  How Would You and/or Your Support Person Like to Participate in Your Care?: include in information regarding Mom and baby, and include in plan of care for Mom and baby  What Anxieties, Fears, Concerns, or Questions Do You Have About Your Care?: none voiced this shift  Patient Specific Preferences: Breastfeeding baby  Goal: Discharge Needs Assessment  Outcome: Ongoing (interventions implemented as appropriate)  Flowsheets  Taken 3/17/2020 0619 by Daniela Bray RN  Equipment Needed After Discharge: none  Equipment Currently Used at Home: none  Anticipated Changes Related to Illness: none  Transportation Anticipated: car, drives self;family or friend will provide  Transportation Concerns: car, none  Patient/Family Anticipated Services at Transition: none  Patient/Family Anticipates Transition to: home  Taken 3/16/2020 1716 by Blanca Gonzalez RN  Concerns to be Addressed: no discharge needs  identified  Readmission Within the Last 30 Days: no previous admission in last 30 days  Goal: Interprofessional Rounds/Family Conf  Outcome: Ongoing (interventions implemented as appropriate)     Problem: Labor (Cervical Ripen, Induct, Augment) (Adult,Obstetrics,Pediatric)  Goal: Signs and Symptoms of Listed Potential Problems Will be Absent, Minimized or Managed (Labor)  Outcome: Ongoing (interventions implemented as appropriate)

## 2020-03-17 NOTE — PLAN OF CARE
Problem: Patient Care Overview  Goal: Plan of Care Review  Outcome: Ongoing (interventions implemented as appropriate)  Flowsheets  Taken 3/17/2020 0619  Progress: improving  Outcome Summary: VSS, FFML lochia scant. Pt using ice packs,dermaplast along with a squirt bottle for kyle care r/t first degree laceration.  Percocet x 2 and Motrin x 1 given for pain with good results.  Pt bonding with baby and breastfeeding is going well.  Pt is ambulating in room and has been able to sleep during this shift.  Taken 3/16/2020 2028  Plan of Care Reviewed With: patient;significant other  Goal: Individualization and Mutuality  Outcome: Ongoing (interventions implemented as appropriate)  Flowsheets (Taken 3/17/2020 0619)  Patient Specific Goals (Include Timeframe): Home with healthy baby via breastfeeding by discharge  How to Address Anxieties/Fears: none voiced this shift  Patient Specific Interventions: comfort products for kyle care and pain medication  What Information Would Help Us Give You More Personalized Care?: none voiced  How Would You and/or Your Support Person Like to Participate in Your Care?: include in information regarding Mom and baby, and include in plan of care for Mom and baby  What Anxieties, Fears, Concerns, or Questions Do You Have About Your Care?: none voiced this shift  Patient Specific Preferences: Breastfeeding baby  Goal: Discharge Needs Assessment  Outcome: Ongoing (interventions implemented as appropriate)  Flowsheets  Taken 3/17/2020 0619 by Daniela Bray RN  Equipment Needed After Discharge: none  Equipment Currently Used at Home: none  Anticipated Changes Related to Illness: none  Transportation Anticipated: car, drives self;family or friend will provide  Transportation Concerns: car, none  Patient/Family Anticipated Services at Transition: none  Patient/Family Anticipates Transition to: home  Taken 3/16/2020 1716 by Blanca Gonzalez RN  Concerns to be Addressed: no discharge needs  identified  Readmission Within the Last 30 Days: no previous admission in last 30 days  Goal: Interprofessional Rounds/Family Conf  Outcome: Ongoing (interventions implemented as appropriate)

## 2020-03-18 VITALS
WEIGHT: 165.38 LBS | DIASTOLIC BLOOD PRESSURE: 69 MMHG | HEART RATE: 76 BPM | SYSTOLIC BLOOD PRESSURE: 115 MMHG | HEIGHT: 63 IN | TEMPERATURE: 98.4 F | RESPIRATION RATE: 16 BRPM | OXYGEN SATURATION: 98 % | BODY MASS INDEX: 29.3 KG/M2

## 2020-03-18 PROCEDURE — 99238 HOSP IP/OBS DSCHRG MGMT 30/<: CPT | Performed by: OBSTETRICS & GYNECOLOGY

## 2020-03-18 RX ORDER — HYDROCODONE BITARTRATE AND ACETAMINOPHEN 7.5; 325 MG/1; MG/1
1 TABLET ORAL EVERY 4 HOURS PRN
Qty: 10 TABLET | Refills: 0 | Status: SHIPPED | OUTPATIENT
Start: 2020-03-18 | End: 2020-03-26

## 2020-03-18 RX ADMIN — IBUPROFEN 600 MG: 600 TABLET ORAL at 04:40

## 2020-03-18 RX ADMIN — PRENATAL VIT W/ FE FUMARATE-FA TAB 27-0.8 MG 1 TABLET: 27-0.8 TAB at 08:27

## 2020-03-18 RX ADMIN — HYDROCODONE BITARTRATE AND ACETAMINOPHEN 1 TABLET: 7.5; 325 TABLET ORAL at 09:22

## 2020-03-18 RX ADMIN — HYDROCODONE BITARTRATE AND ACETAMINOPHEN 1 TABLET: 7.5; 325 TABLET ORAL at 04:40

## 2020-03-18 RX ADMIN — BENZOCAINE AND LEVOMENTHOL: 200; 5 SPRAY TOPICAL at 09:22

## 2020-03-18 NOTE — DISCHARGE SUMMARY
Discharge Summary     Idris Ruano  : 2000  MRN: [id number]  CSN: [id number]    Date of Admission: 3/15/2020   Date of Discharge:  3/18/2020   Delivering Physician: Prasanna Alexander        Admission Diagnosis: 1. Pregnancy [Z34.90]  2. Normal labor [O80, Z37.9]   Discharge Diagnosis: 1. Pregnancy at 39w5d - delivered       Procedures: 3/16/2020  - Vaginal, Spontaneous       Hospital Course  Patient is a 19 y.o.  who at 39w5d had a vaginal birth.  Her postpartum course was without complications.  On PPD #2 she was ready for discharge.  She had normal lochia and pain well controlled with oral medications.    Infant  male  fetus weighing 3140 g (6 lb 14.8 oz)   Apgars -  8  @ 1 minute /  9  @ 5 minutes.    Discharge labs  Lab Results   Component Value Date    WBC 12.13 (H) 2020    HGB 9.2 (L) 2020    HCT 28.6 (L) 2020     2020       Discharge Medications     Discharge Medications      New Medications      Instructions Start Date   HYDROcodone-acetaminophen 7.5-325 MG per tablet  Commonly known as:  NORCO   1 tablet, Oral, Every 4 Hours PRN         Continue These Medications      Instructions Start Date   prenatal vitamin 27-0.8 27-0.8 MG tablet tablet   1 tablet, Oral, Daily             Discharge Disposition Home or Self Care   Condition on Discharge: good   Follow-up: 6 weeks with Juan Carlos Amador,   3/18/2020

## 2020-03-18 NOTE — LACTATION NOTE
This note was copied from a baby's chart.  Infant:  Richard    Mom to be DC'd today. Per her request, reminded her that [employer]'s number (for pump) is on back cover of Bfing Book, same shown. Family to  from there today.     Infant circumcised this AM. RN and I reiterated to hold infant skin to skin and bf as much as possible, noting infant would be sleepier today. Encouraged hand expression.    Encouraged feeding at cues going no longer than 3 hours between feeds. Guidance given on expected output, frequent breast emptying.    Breast exam:  Breasts full, nipples reddened, but intact. L more than R.     Emphasized to call for help if needed after DC. Infant  to see Dr. Dias tomorrow.

## 2020-03-18 NOTE — PROGRESS NOTES
"[facility]  Vaginal Delivery Progress Note    Subjective   Postpartum Day 2: Vaginal Delivery    The patient feels well.  Her pain is well controlled with nonsteroidal anti-inflammatory drugs and opioid analgesics.   She is ambulating well.  Patient describes her bleeding as thin lochia.    Breastfeeding: infant latching without difficulty.    Objective     Vital Signs Range for the last 24 hours  Temperature: Temp:  [98 °F (36.7 °C)] 98 °F (36.7 °C)   Temp Source: Temp src: Temporal   BP: BP: (115)/(52) 115/52   Pulse: Heart Rate:  [81] 81   Respirations: Resp:  [18] 18   SPO2: SpO2:  [97 %] 97 %   O2 Amount (l/min):     O2 Devices Device (Oxygen Therapy): room air   Weight:       Admit Height:  Height: 160 cm (63\")      Physical Exam:  General:  no acute distresss.  Abdomen: abdomen is soft without significant tenderness, masses, organomegaly or guarding. Fundus: appropriate, firm, non tender  Extremities: normal, atraumatic, no cyanosis, and trace edema.     Lab results reviewed:  Yes   Rubella:  No results found for: RUBELLAIGGIN Nurse Transcribed from prenatal record --  No components found for: EXTRUBELQUAL  Rh Status:    RH type   Date Value Ref Range Status   03/17/2020 Negative  Final     Immunizations:   Immunization History   Administered Date(s) Administered   • Rho (D) Immune Globulin 03/17/2020     Lab Results (last 24 hours)     ** No results found for the last 24 hours. **          Assessment/Plan       Normal labor    Pregnancy      Melissa Huitron Ruano is Day 2  post-partum  Vaginal, Spontaneous    .      Plan:  Discharge home with standard precautions and return to clinic in 4-6 weeks.      Sindhu Amador DO  3/18/2020  08:18      "

## 2020-03-18 NOTE — PAYOR COMM NOTE
"AUTH# [id number]                       DISCHARGE NOTIFICATION  3/18/2020  MOM & INFANT DC'D TO HOME    (INFANT: CHRIS MEEK)    THANKS, ALISON JUNE [facility]/                 P: [phone]                  F: [phone]    Bindu Melissa Viera (19 y.o. Female)     Date of Birth Social Security Number Address Home Phone MRN    2000  [address] [phone] [id number]    Oriental orthodox Marital Status          Other Single       Admission Date Admission Type Admitting Provider Attending Provider Department, Room/Bed    3/15/20 Elective Prasanna Alexander MD  [facility] MOTHER BABY 2A, M236/1    Discharge Date Discharge Disposition Discharge Destination        3/18/2020 Home or Self Care              Attending Provider:  (none)   Allergies:  Penicillins, Latex    Isolation:  None   Infection:  None   Code Status:  CPR    Ht:  160 cm (63\")   Wt:  75 kg (165 lb 6 oz)    Admission Cmt:  None   Principal Problem:  Normal labor [O80,Z37.9]                 Active Insurance as of 3/15/2020     Primary Coverage     Payor Plan Insurance Group Employer/Plan Group    [employer] MEDICAID [employer] MEDICAID [id number]     Payor Plan Address Payor Plan Phone Number Payor Plan Fax Number Effective Dates    [address] [address] [phone]  2019 - None Entered    [facility] [address]       Subscriber Name Subscriber Birth Date Member ID       RUANOMELISSA VIERA 2000 [id number]                 Emergency Contacts      (Rel.) Home Phone Work Phone Mobile Phone    KENZIE RUANO (Mother) [phone] -- --               Discharge Summary      Sindhu Amador DO at 20 0822          Discharge Summary     Idris  Melissa Ruano  : 2000  MRN: [id number]  CSN: [id number]    Date of Admission: 3/15/2020   Date of Discharge:  3/18/2020   Delivering Physician: Prasanna Alexander        Admission Diagnosis: 1. Pregnancy [Z34.90]  2. Normal " labor [O80, Z37.9]   Discharge Diagnosis: 1. Pregnancy at 39w5d - delivered       Procedures: 3/16/2020  - Vaginal, Spontaneous       Hospital Course  Patient is a 19 y.o.  who at 39w5d had a vaginal birth.  Her postpartum course was without complications.  On PPD #2 she was ready for discharge.  She had normal lochia and pain well controlled with oral medications.    Infant  male  fetus weighing 3140 g (6 lb 14.8 oz)   Apgars -  8  @ 1 minute /  9  @ 5 minutes.    Discharge labs  Lab Results   Component Value Date    WBC 12.13 (H) 2020    HGB 9.2 (L) 2020    HCT 28.6 (L) 2020     2020       Discharge Medications     Discharge Medications      New Medications      Instructions Start Date   HYDROcodone-acetaminophen 7.5-325 MG per tablet  Commonly known as:  NORCO   1 tablet, Oral, Every 4 Hours PRN         Continue These Medications      Instructions Start Date   prenatal vitamin 27-0.8 27-0.8 MG tablet tablet   1 tablet, Oral, Daily             Discharge Disposition Home or Self Care   Condition on Discharge: good   Follow-up: 6 weeks with Juan Carlos Amador DO  3/18/2020      Electronically signed by Sindhu Amador DO at 20 9778

## 2020-03-18 NOTE — PLAN OF CARE
Problem: Patient Care Overview  Goal: Plan of Care Review  Outcome: Ongoing (interventions implemented as appropriate)  Flowsheets (Taken 3/18/2020 6093)  Progress: improving  Plan of Care Reviewed With: patient; significant other  Outcome Summary: VSS, FFML U2, scant rubra, 1st degree lac, labial swelling, voiding, did use sitz bath this shift, using kyle ice packs, dermaplast spray, and witch hazel pads, breastfeeding, nipples tender, using nipple cream, PO pain meds for pain

## 2020-03-23 ENCOUNTER — TELEPHONE (OUTPATIENT)
Dept: LABOR AND DELIVERY | Facility: HOSPITAL | Age: 20
End: 2020-03-23

## 2020-03-23 NOTE — TELEPHONE ENCOUNTER
Infant:  Freddie  :  3/16/2020    Pt reports had mastitis day after DC. She was told by Ob/Gyn Rangel to pump and dump her milk for 24 hours. She did this, giving formula. She began an antibiotic regimen; now finished with same. She was very fearful that Freddie would not return to breast, but he did. She also continues to use warmth before emptying and cold after emptying breasts. She pumps as needed in addition to feeds. Instructed mother that milk expressed during mastitis is safe for baby.  Encouraged yogurt or probiotics to avoid yeast overgrowth. Much praise given to mother. Encouraged cont'ed bfing.

## 2020-03-24 LAB
CYTO UR: NORMAL
LAB AP CASE REPORT: NORMAL
LAB AP CLINICAL INFORMATION: NORMAL
PATH REPORT.FINAL DX SPEC: NORMAL
PATH REPORT.GROSS SPEC: NORMAL